# Patient Record
Sex: FEMALE | Race: WHITE | Employment: FULL TIME | URBAN - METROPOLITAN AREA
[De-identification: names, ages, dates, MRNs, and addresses within clinical notes are randomized per-mention and may not be internally consistent; named-entity substitution may affect disease eponyms.]

---

## 2024-07-17 ENCOUNTER — OFFICE VISIT (OUTPATIENT)
Dept: RHEUMATOLOGY | Age: 39
End: 2024-07-17

## 2024-07-17 VITALS
HEIGHT: 69 IN | HEART RATE: 94 BPM | BODY MASS INDEX: 36.14 KG/M2 | WEIGHT: 244 LBS | DIASTOLIC BLOOD PRESSURE: 94 MMHG | SYSTOLIC BLOOD PRESSURE: 133 MMHG

## 2024-07-17 DIAGNOSIS — R76.8 POSITIVE ANA (ANTINUCLEAR ANTIBODY): ICD-10-CM

## 2024-07-17 DIAGNOSIS — R76.8 POSITIVE ANA (ANTINUCLEAR ANTIBODY): Primary | ICD-10-CM

## 2024-07-17 DIAGNOSIS — D69.3 ITP SECONDARY TO INFECTION (HCC): ICD-10-CM

## 2024-07-17 LAB
APPEARANCE UR: CLEAR
BACTERIA URNS QL MICRO: ABNORMAL /HPF
BILIRUB UR QL: NEGATIVE
CASTS URNS QL MICRO: 0 /LPF
COLOR UR: ABNORMAL
CRYSTALS URNS QL MICRO: 0 /LPF
EPI CELLS #/AREA URNS HPF: ABNORMAL /HPF (ref 0–5)
GLUCOSE UR STRIP.AUTO-MCNC: 500 MG/DL
HGB UR QL STRIP: NEGATIVE
HYALINE CASTS URNS QL MICRO: ABNORMAL /LPF
KETONES UR QL STRIP.AUTO: NEGATIVE MG/DL
LEUKOCYTE ESTERASE UR QL STRIP.AUTO: ABNORMAL
MUCOUS THREADS URNS QL MICRO: 0 /LPF
NITRITE UR QL STRIP.AUTO: NEGATIVE
PH UR STRIP: 5.5 (ref 5–9)
PROT UR STRIP-MCNC: NEGATIVE MG/DL
RBC #/AREA URNS HPF: ABNORMAL /HPF (ref 0–5)
SP GR UR REFRACTOMETRY: 1.03 (ref 1–1.02)
URINE CULTURE IF INDICATED: ABNORMAL
UROBILINOGEN UR QL STRIP.AUTO: 0.2 EU/DL (ref 0.2–1)
WBC URNS QL MICRO: ABNORMAL /HPF (ref 0–4)

## 2024-07-17 RX ORDER — PROPRANOLOL HYDROCHLORIDE 20 MG/1
20 TABLET ORAL 2 TIMES DAILY
COMMUNITY
Start: 2024-06-27 | End: 2024-07-27

## 2024-07-17 RX ORDER — SITAGLIPTIN 50 MG/1
50 TABLET, FILM COATED ORAL DAILY
COMMUNITY
Start: 2024-06-06

## 2024-07-17 RX ORDER — BUTALBITAL, ACETAMINOPHEN AND CAFFEINE 50; 325; 40 MG/1; MG/1; MG/1
1 TABLET ORAL EVERY 6 HOURS PRN
COMMUNITY
Start: 2024-07-08 | End: 2024-08-07

## 2024-07-17 ASSESSMENT — JOINT PAIN
TOTAL NUMBER OF SWOLLEN JOINTS: 10
TOTAL NUMBER OF TENDER JOINTS: 10

## 2024-07-17 ASSESSMENT — ROUTINE ASSESSMENT OF PATIENT INDEX DATA (RAPID3)
WHEN YOU AWAKENED IN THE MORNING OVER THE LAST WEEK, PLEASE INDICATE THE AMOUNT OF TIME IT TAKES UNTIL YOU ARE AS LIMBER AS YOU WILL BE FOR THE DAY: < 10 MIN
ON A SCALE OF ONE TO TEN, HOW DIFFICULT WAS IT FOR YOU TO COMPLETE THE LISTED DAILY PHYSICAL TASKS OVER THE LAST WEEK: 0.9
ON A SCALE OF ONE TO TEN, HOW MUCH PAIN HAVE YOU HAD BECAUSE OF YOUR CONDITION OVER THE PAST WEEK?: 5
ON A SCALE OF ONE TO TEN, HOW MUCH OF A PROBLEM HAS UNUSUAL FATIGUE OR TIREDNESS BEEN FOR YOU OVER THE PAST WEEK?: 9
ON A SCALE OF ONE TO TEN, CONSIDERING ALL THE WAYS IN WHICH ILLNESS AND HEALTH CONDITIONS MAY AFFECT YOU AT THIS TIME, PLEASE INDICATE BELOW HOW YOU ARE DOING:: 7

## 2024-07-17 NOTE — PROGRESS NOTES
[facility]  Joseline Cheek M.D.  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 I have included the full consultation note below.  I will continue to follow your patient and will forward all future office visit notes to you.  If you have any questions or concerns about any aspect of your patient's care, please do not hesitate to contact me.    I look forward to continuing to care for this patient with you.    Sincerely,    Dr. Cheek          RHEUMATOLOGY INITIAL CONSULTATION NOTE  Date of Visit:  2024 2:40 PM    Patient Information:  Name:  Shyla Barker  :  1985  Age:  39 y.o.   Gender:  female    PHYSICIAN REQUESTING CONSULTATION: Dr. Rowland    Chief Complaint:  Chief Complaint   Patient presents with    Abnormal Test Results    New Patient    Joint Pain     History of Present Illness:  Shyla Barekr is a 39 y.o. 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  On reviewing patient's records it does appear that she was admitted to the hospital in [place] in 2024 with ITP and her platelet count was found to be 5000.  She did have a history of a tick bite preceding the admission to the hospital sometime in February of this year.  Since her Lyme IgG Western blot was positive in April while in the hospital the patient was started on doxycycline in  which she remained on for 2 weeks.    She was admitted again at [facility] from 2024 to 2024 when she did receive IVIG 1 g x 2 days and her platelet count did rise with the IVIG treatments.  Her hematologist was trying to get her approved for IVIG treatments as an

## 2024-07-18 LAB
APTT SCREEN TO CONFIRM RATIO: 1.09 RATIO (ref 0–1.34)
B2 GLYCOPROT1 IGG SER-ACNC: <9 GPI IGG UNITS (ref 0–20)
B2 GLYCOPROT1 IGM SER-ACNC: <9 GPI IGM UNITS (ref 0–32)
CENTROMERE B AB SER-ACNC: <0.2 AI (ref 0–0.9)
CONFIRM APTT/NORMAL: 30.3 SEC (ref 0–47.6)
DSDNA AB SER-ACNC: <1 IU/ML (ref 0–9)
ENA JO1 AB SER-ACNC: <0.2 AI (ref 0–0.9)
ENA RNP AB SER-ACNC: 0.5 AI (ref 0–0.9)
ENA SCL70 AB SER-ACNC: <0.2 AI (ref 0–0.9)
ENA SM AB SER-ACNC: <0.2 AI (ref 0–0.9)
ENA SS-A AB SER-ACNC: <0.2 AI (ref 0–0.9)
ENA SS-B AB SER-ACNC: <0.2 AI (ref 0–0.9)
HISTONE IGG SER IA-ACNC: 0.6 UNITS (ref 0–0.9)
LA 2 SCREEN W REFLEX-IMP: NORMAL
SCREEN APTT: 29.4 SEC (ref 0–43.5)
SCREEN DRVVT: 33.8 SEC (ref 0–47)
THROMBIN TIME: 16.3 SEC (ref 0–23)

## 2024-07-19 LAB
BACTERIA SPEC CULT: ABNORMAL
C3 SERPL-MCNC: 180 MG/DL (ref 82–167)
C4 SERPL-MCNC: 39 MG/DL (ref 12–38)
CARDIOLIPIN IGA SER IA-ACNC: <9 APL U/ML (ref 0–11)
CARDIOLIPIN IGG SER IA-ACNC: <9 GPL U/ML (ref 0–14)
CARDIOLIPIN IGM SER IA-ACNC: <9 MPL U/ML (ref 0–12)
SERVICE CMNT-IMP: ABNORMAL

## 2024-08-14 ENCOUNTER — OFFICE VISIT (OUTPATIENT)
Dept: RHEUMATOLOGY | Age: 39
End: 2024-08-14
Payer: MEDICARE

## 2024-08-14 VITALS
DIASTOLIC BLOOD PRESSURE: 86 MMHG | SYSTOLIC BLOOD PRESSURE: 141 MMHG | BODY MASS INDEX: 36.29 KG/M2 | HEART RATE: 97 BPM | HEIGHT: 69 IN | WEIGHT: 245 LBS

## 2024-08-14 DIAGNOSIS — Z01.89 ENCOUNTER FOR LABORATORY TEST: ICD-10-CM

## 2024-08-14 DIAGNOSIS — N30.01 ACUTE CYSTITIS WITH HEMATURIA: Primary | ICD-10-CM

## 2024-08-14 DIAGNOSIS — N30.01 ACUTE CYSTITIS WITH HEMATURIA: ICD-10-CM

## 2024-08-14 DIAGNOSIS — Z86.2 HISTORY OF ITP: ICD-10-CM

## 2024-08-14 LAB
APPEARANCE UR: CLEAR
BACTERIA URNS QL MICRO: NEGATIVE /HPF
BILIRUB UR QL: NEGATIVE
CASTS URNS QL MICRO: 0 /LPF
COLOR UR: ABNORMAL
CRYSTALS URNS QL MICRO: 0 /LPF
EPI CELLS #/AREA URNS HPF: ABNORMAL /HPF (ref 0–5)
GLUCOSE UR STRIP.AUTO-MCNC: >1000 MG/DL
HGB UR QL STRIP: NEGATIVE
HYALINE CASTS URNS QL MICRO: ABNORMAL /LPF
KETONES UR QL STRIP.AUTO: NEGATIVE MG/DL
LEUKOCYTE ESTERASE UR QL STRIP.AUTO: NEGATIVE
MUCOUS THREADS URNS QL MICRO: 0 /LPF
NITRITE UR QL STRIP.AUTO: NEGATIVE
PH UR STRIP: 5.5 (ref 5–9)
PROT UR STRIP-MCNC: NEGATIVE MG/DL
RBC #/AREA URNS HPF: ABNORMAL /HPF (ref 0–5)
SP GR UR REFRACTOMETRY: 1.03 (ref 1–1.02)
URINE CULTURE IF INDICATED: ABNORMAL
UROBILINOGEN UR QL STRIP.AUTO: 0.2 EU/DL (ref 0.2–1)
WBC URNS QL MICRO: ABNORMAL /HPF (ref 0–4)

## 2024-08-14 PROCEDURE — 99215 OFFICE O/P EST HI 40 MIN: CPT | Performed by: INTERNAL MEDICINE

## 2024-08-14 PROCEDURE — G2211 COMPLEX E/M VISIT ADD ON: HCPCS | Performed by: INTERNAL MEDICINE

## 2024-08-14 RX ORDER — INSULIN GLARGINE 100 [IU]/ML
INJECTION, SOLUTION SUBCUTANEOUS NIGHTLY
COMMUNITY

## 2024-08-14 RX ORDER — ACETAMINOPHEN 500 MG
500 TABLET ORAL EVERY 6 HOURS PRN
COMMUNITY

## 2024-08-14 ASSESSMENT — JOINT PAIN
TOTAL NUMBER OF TENDER JOINTS: 6
TOTAL NUMBER OF SWOLLEN JOINTS: 0

## 2024-08-14 ASSESSMENT — ROUTINE ASSESSMENT OF PATIENT INDEX DATA (RAPID3)
ON A SCALE OF ONE TO TEN, HOW MUCH PAIN HAVE YOU HAD BECAUSE OF YOUR CONDITION OVER THE PAST WEEK?: 8
ON A SCALE OF ONE TO TEN, HOW MUCH OF A PROBLEM HAS UNUSUAL FATIGUE OR TIREDNESS BEEN FOR YOU OVER THE PAST WEEK?: 10
WHEN YOU AWAKENED IN THE MORNING OVER THE LAST WEEK, PLEASE INDICATE THE AMOUNT OF TIME IT TAKES UNTIL YOU ARE AS LIMBER AS YOU WILL BE FOR THE DAY: < 10 MIN
ON A SCALE OF ONE TO TEN, HOW DIFFICULT WAS IT FOR YOU TO COMPLETE THE LISTED DAILY PHYSICAL TASKS OVER THE LAST WEEK: 0.8
ON A SCALE OF ONE TO TEN, CONSIDERING ALL THE WAYS IN WHICH ILLNESS AND HEALTH CONDITIONS MAY AFFECT YOU AT THIS TIME, PLEASE INDICATE BELOW HOW YOU ARE DOING:: 6

## 2024-08-14 NOTE — PROGRESS NOTES
[facility]  Joseline Cheek M.D.  [address] , [address]   [facility]  Office : [phone], Fax: [phone]     RHEUMATOLOGY OFFICE VISIT NOTE  Date of Visit:  2024 2:04 PM    Patient Information:  Name:  Shyla Barker  :  1985  Age:  39 y.o.   Gender:  female      Ms. Barker is here today for joint pain, history of ITP and a positive CHIDI to discuss results from the last visit.     Last visit: 24    History of Present Illness: On talking to the patient today she states that she has had jacob bleeding in her urine which could be from her platelet count being low secondary to ITP, but has not followed up with her hematologist yet.  Patient does have an appointment to see the hematologist tomorrow which she was instructed to keep.  She states that she has this overall achy feeling with ongoing joint pain as mentioned below.    Since the last visit, patient is feeling \"worse\".    Pain: 8/10  Location:  Bilateral ankle pain with swelling with no warmth and redness. Occasional buckling worse in the right than the left ankle. Bilateral hip pain and discomfort in the groin crease. Some lower back and mid back pain. Some neck stiffness with some pain with neck ROM to the left. Occasional tension headaches. Bilateral knee pain with swelling with no warmth and redness. Occasional buckling of the right knee. Some pain in the soles of her feet.   Quality:  Deep achy pain.   Modifying Factors:  End of the day the pain is the worst.   Associated Symptoms:  Intermittent pain from the right arm from the right shoulder to the right elbow and pain in the right hand. No tingling, numbness or pain down the left arm. Intermittent pain, tingling and numbness from the hips to the toes worse in the right than the left. Some left leg weakness.  No limitations with her ADL's.         2024    10:00 AM   [facility]   AM Stiffness < 10 min   Pain 8

## 2025-01-14 ENCOUNTER — OFFICE VISIT (OUTPATIENT)
Dept: RHEUMATOLOGY | Age: 40
End: 2025-01-14
Payer: COMMERCIAL

## 2025-01-14 VITALS
OXYGEN SATURATION: 97 % | SYSTOLIC BLOOD PRESSURE: 130 MMHG | DIASTOLIC BLOOD PRESSURE: 86 MMHG | HEART RATE: 98 BPM | BODY MASS INDEX: 34.6 KG/M2 | HEIGHT: 69 IN | WEIGHT: 233.6 LBS

## 2025-01-14 DIAGNOSIS — Z86.2 HISTORY OF ITP: Primary | ICD-10-CM

## 2025-01-14 DIAGNOSIS — R76.8 POSITIVE ANA (ANTINUCLEAR ANTIBODY): ICD-10-CM

## 2025-01-14 PROCEDURE — 99214 OFFICE O/P EST MOD 30 MIN: CPT | Performed by: INTERNAL MEDICINE

## 2025-01-14 RX ORDER — IBUPROFEN 200 MG
200 TABLET ORAL EVERY 6 HOURS PRN
COMMUNITY

## 2025-01-14 ASSESSMENT — JOINT PAIN
TOTAL NUMBER OF TENDER JOINTS: 11
TOTAL NUMBER OF SWOLLEN JOINTS: 2

## 2025-01-14 ASSESSMENT — ROUTINE ASSESSMENT OF PATIENT INDEX DATA (RAPID3)
ON A SCALE OF ONE TO TEN, CONSIDERING ALL THE WAYS IN WHICH ILLNESS AND HEALTH CONDITIONS MAY AFFECT YOU AT THIS TIME, PLEASE INDICATE BELOW HOW YOU ARE DOING:: 5
WHEN YOU AWAKENED IN THE MORNING OVER THE LAST WEEK, PLEASE INDICATE THE AMOUNT OF TIME IT TAKES UNTIL YOU ARE AS LIMBER AS YOU WILL BE FOR THE DAY: 15 MIN
ON A SCALE OF ONE TO TEN, HOW MUCH OF A PROBLEM HAS UNUSUAL FATIGUE OR TIREDNESS BEEN FOR YOU OVER THE PAST WEEK?: 5
ON A SCALE OF ONE TO TEN, HOW DIFFICULT WAS IT FOR YOU TO COMPLETE THE LISTED DAILY PHYSICAL TASKS OVER THE LAST WEEK: 0.5
ON A SCALE OF ONE TO TEN, HOW MUCH PAIN HAVE YOU HAD BECAUSE OF YOUR CONDITION OVER THE PAST WEEK?: 3

## 2025-01-14 NOTE — PROGRESS NOTES
As stated above, if applicable.    Pain management plan:  As stated above, if applicable.    Weight management plan:  Weight loss through diet and exercise is always encouraged    Disease prognosis: Good    I appreciate the opportunity to continue to participate in the care of this patient.     Follow-up and Dispositions    Return in about 9 months (around 10/14/2025).       Electronically signed by:  Joseline Cheek MD      This note was dictated using dragon voice recognition software.  It has been proofread, but there may still exist voice recognition errors that the author did not detect.                --------------------------------------------------------------------------------------------------------------------------------------------------------------------------------------------------------------------------------